# Patient Record
Sex: FEMALE
[De-identification: names, ages, dates, MRNs, and addresses within clinical notes are randomized per-mention and may not be internally consistent; named-entity substitution may affect disease eponyms.]

---

## 2017-07-12 PROBLEM — Z00.00 ENCOUNTER FOR PREVENTIVE HEALTH EXAMINATION: Status: ACTIVE | Noted: 2017-07-12

## 2017-07-20 ENCOUNTER — APPOINTMENT (OUTPATIENT)
Dept: INTERNAL MEDICINE | Facility: CLINIC | Age: 37
End: 2017-07-20

## 2018-08-09 ENCOUNTER — EMERGENCY (EMERGENCY)
Facility: HOSPITAL | Age: 38
LOS: 1 days | Discharge: HOME | End: 2018-08-09
Attending: EMERGENCY MEDICINE | Admitting: EMERGENCY MEDICINE
Payer: COMMERCIAL

## 2018-08-09 VITALS
SYSTOLIC BLOOD PRESSURE: 144 MMHG | DIASTOLIC BLOOD PRESSURE: 90 MMHG | HEART RATE: 66 BPM | TEMPERATURE: 97 F | OXYGEN SATURATION: 98 % | RESPIRATION RATE: 18 BRPM

## 2018-08-09 LAB
ALBUMIN SERPL ELPH-MCNC: 4.3 G/DL — SIGNIFICANT CHANGE UP (ref 3.3–5)
ALP SERPL-CCNC: 68 U/L — SIGNIFICANT CHANGE UP (ref 40–120)
ALT FLD-CCNC: 18 U/L — SIGNIFICANT CHANGE UP (ref 4–33)
APPEARANCE UR: SIGNIFICANT CHANGE UP
AST SERPL-CCNC: 24 U/L — SIGNIFICANT CHANGE UP (ref 4–32)
BACTERIA # UR AUTO: SIGNIFICANT CHANGE UP
BILIRUB SERPL-MCNC: 0.3 MG/DL — SIGNIFICANT CHANGE UP (ref 0.2–1.2)
BILIRUB UR-MCNC: NEGATIVE — SIGNIFICANT CHANGE UP
BLOOD UR QL VISUAL: NEGATIVE — SIGNIFICANT CHANGE UP
BUN SERPL-MCNC: 12 MG/DL — SIGNIFICANT CHANGE UP (ref 7–23)
CALCIUM SERPL-MCNC: 9.5 MG/DL — SIGNIFICANT CHANGE UP (ref 8.4–10.5)
CHLORIDE SERPL-SCNC: 100 MMOL/L — SIGNIFICANT CHANGE UP (ref 98–107)
CO2 SERPL-SCNC: 25 MMOL/L — SIGNIFICANT CHANGE UP (ref 22–31)
COLOR SPEC: YELLOW — SIGNIFICANT CHANGE UP
CREAT SERPL-MCNC: 0.66 MG/DL — SIGNIFICANT CHANGE UP (ref 0.5–1.3)
GLUCOSE SERPL-MCNC: 113 MG/DL — HIGH (ref 70–99)
GLUCOSE UR-MCNC: NEGATIVE — SIGNIFICANT CHANGE UP
HCT VFR BLD CALC: 33 % — LOW (ref 34.5–45)
HGB BLD-MCNC: 10.7 G/DL — LOW (ref 11.5–15.5)
KETONES UR-MCNC: SIGNIFICANT CHANGE UP
LEUKOCYTE ESTERASE UR-ACNC: HIGH
MCHC RBC-ENTMCNC: 26.2 PG — LOW (ref 27–34)
MCHC RBC-ENTMCNC: 32.4 % — SIGNIFICANT CHANGE UP (ref 32–36)
MCV RBC AUTO: 80.7 FL — SIGNIFICANT CHANGE UP (ref 80–100)
MUCOUS THREADS # UR AUTO: SIGNIFICANT CHANGE UP
NITRITE UR-MCNC: NEGATIVE — SIGNIFICANT CHANGE UP
NRBC # FLD: 0 — SIGNIFICANT CHANGE UP
PH UR: 7 — SIGNIFICANT CHANGE UP (ref 4.6–8)
PLATELET # BLD AUTO: 193 K/UL — SIGNIFICANT CHANGE UP (ref 150–400)
PMV BLD: 12.2 FL — SIGNIFICANT CHANGE UP (ref 7–13)
POTASSIUM SERPL-MCNC: 3.7 MMOL/L — SIGNIFICANT CHANGE UP (ref 3.5–5.3)
POTASSIUM SERPL-SCNC: 3.7 MMOL/L — SIGNIFICANT CHANGE UP (ref 3.5–5.3)
PROT SERPL-MCNC: 7.9 G/DL — SIGNIFICANT CHANGE UP (ref 6–8.3)
PROT UR-MCNC: 100 MG/DL — HIGH
RBC # BLD: 4.09 M/UL — SIGNIFICANT CHANGE UP (ref 3.8–5.2)
RBC # FLD: 12.5 % — SIGNIFICANT CHANGE UP (ref 10.3–14.5)
RBC CASTS # UR COMP ASSIST: HIGH (ref 0–?)
SODIUM SERPL-SCNC: 139 MMOL/L — SIGNIFICANT CHANGE UP (ref 135–145)
SP GR SPEC: 1.03 — SIGNIFICANT CHANGE UP (ref 1–1.04)
SQUAMOUS # UR AUTO: SIGNIFICANT CHANGE UP
UROBILINOGEN FLD QL: NORMAL MG/DL — SIGNIFICANT CHANGE UP
WBC # BLD: 7.67 K/UL — SIGNIFICANT CHANGE UP (ref 3.8–10.5)
WBC # FLD AUTO: 7.67 K/UL — SIGNIFICANT CHANGE UP (ref 3.8–10.5)
WBC UR QL: HIGH (ref 0–?)

## 2018-08-09 PROCEDURE — 76705 ECHO EXAM OF ABDOMEN: CPT | Mod: 26

## 2018-08-09 PROCEDURE — 99284 EMERGENCY DEPT VISIT MOD MDM: CPT

## 2018-08-09 RX ORDER — CEPHALEXIN 500 MG
500 CAPSULE ORAL ONCE
Refills: 0 | Status: DISCONTINUED | OUTPATIENT
Start: 2018-08-09 | End: 2018-08-13

## 2018-08-09 RX ORDER — FAMOTIDINE 10 MG/ML
20 INJECTION INTRAVENOUS ONCE
Refills: 0 | Status: COMPLETED | OUTPATIENT
Start: 2018-08-09 | End: 2018-08-09

## 2018-08-09 RX ORDER — CEPHALEXIN 500 MG
1 CAPSULE ORAL
Qty: 10 | Refills: 0
Start: 2018-08-09 | End: 2018-08-13

## 2018-08-09 RX ORDER — FAMOTIDINE 10 MG/ML
1 INJECTION INTRAVENOUS
Qty: 60 | Refills: 0
Start: 2018-08-09 | End: 2018-09-07

## 2018-08-09 RX ORDER — SODIUM CHLORIDE 9 MG/ML
1000 INJECTION INTRAMUSCULAR; INTRAVENOUS; SUBCUTANEOUS ONCE
Refills: 0 | Status: COMPLETED | OUTPATIENT
Start: 2018-08-09 | End: 2018-08-09

## 2018-08-09 RX ADMIN — Medication 30 MILLILITER(S): at 12:55

## 2018-08-09 RX ADMIN — SODIUM CHLORIDE 1000 MILLILITER(S): 9 INJECTION INTRAMUSCULAR; INTRAVENOUS; SUBCUTANEOUS at 12:55

## 2018-08-09 RX ADMIN — FAMOTIDINE 20 MILLIGRAM(S): 10 INJECTION INTRAVENOUS at 12:55

## 2018-08-09 NOTE — ED PROVIDER NOTE - PHYSICAL EXAMINATION
Attending/Calli: Well-appearing, NAD; PERRL/EOMI, non-icterus, supple, no ALEX, no JVD, RRR, CTAB; Abd-soft,+epigastric PT, no rebound, , no HSM; no LE edema, A&Ox3, nonfocal; Skin-warm/dry

## 2018-08-09 NOTE — ED PROVIDER NOTE - PROGRESS NOTE DETAILS
Patient reports feeling much improved. TO have PO challenge. Patient not found in the ED. Patient called overheard. Nursing made aware. Patient now in the ED. Test results and dispo plan reviewed.

## 2018-08-09 NOTE — ED PROVIDER NOTE - OBJECTIVE STATEMENT
Attending/Calli: 38 yo w/ no sig PMHx LMP 8/3/2018 G0 p/w ~ 2 days of nausea and epigastric pain. Denies n/v, change in urinary/bowel habits, no fever/chills, back pain weakness, CP or SOB. Pt reports similar past symptoms when eating heavy and fried foods.

## 2019-10-22 ENCOUNTER — EMERGENCY (EMERGENCY)
Facility: HOSPITAL | Age: 39
LOS: 1 days | Discharge: HOME | End: 2019-10-22
Attending: EMERGENCY MEDICINE | Admitting: EMERGENCY MEDICINE
Payer: COMMERCIAL

## 2019-10-22 ENCOUNTER — OUTPATIENT (OUTPATIENT)
Dept: OUTPATIENT SERVICES | Facility: HOSPITAL | Age: 39
LOS: 1 days | Discharge: STILL ADMITTED OR ADMINISTRATIVE | End: 2019-10-22

## 2019-10-22 VITALS
TEMPERATURE: 98 F | SYSTOLIC BLOOD PRESSURE: 161 MMHG | DIASTOLIC BLOOD PRESSURE: 119 MMHG | RESPIRATION RATE: 17 BRPM | OXYGEN SATURATION: 98 % | HEART RATE: 96 BPM

## 2019-10-22 VITALS
TEMPERATURE: 98 F | DIASTOLIC BLOOD PRESSURE: 105 MMHG | HEART RATE: 100 BPM | SYSTOLIC BLOOD PRESSURE: 152 MMHG | RESPIRATION RATE: 18 BRPM | OXYGEN SATURATION: 98 %

## 2019-10-22 DIAGNOSIS — F41.9 ANXIETY DISORDER, UNSPECIFIED: ICD-10-CM

## 2019-10-22 LAB
ALBUMIN SERPL ELPH-MCNC: 4.4 G/DL — SIGNIFICANT CHANGE UP (ref 3.3–5)
ALP SERPL-CCNC: 74 U/L — SIGNIFICANT CHANGE UP (ref 40–120)
ALT FLD-CCNC: 24 U/L — SIGNIFICANT CHANGE UP (ref 4–33)
ANION GAP SERPL CALC-SCNC: 13 MMO/L — SIGNIFICANT CHANGE UP (ref 7–14)
APAP SERPL-MCNC: < 15 UG/ML — LOW (ref 15–25)
APPEARANCE UR: SIGNIFICANT CHANGE UP
AST SERPL-CCNC: 21 U/L — SIGNIFICANT CHANGE UP (ref 4–32)
BACTERIA # UR AUTO: SIGNIFICANT CHANGE UP
BASOPHILS # BLD AUTO: 0.07 K/UL — SIGNIFICANT CHANGE UP (ref 0–0.2)
BASOPHILS NFR BLD AUTO: 0.9 % — SIGNIFICANT CHANGE UP (ref 0–2)
BILIRUB SERPL-MCNC: 0.3 MG/DL — SIGNIFICANT CHANGE UP (ref 0.2–1.2)
BILIRUB UR-MCNC: NEGATIVE — SIGNIFICANT CHANGE UP
BLOOD UR QL VISUAL: NEGATIVE — SIGNIFICANT CHANGE UP
BUN SERPL-MCNC: 7 MG/DL — SIGNIFICANT CHANGE UP (ref 7–23)
CALCIUM SERPL-MCNC: 9.6 MG/DL — SIGNIFICANT CHANGE UP (ref 8.4–10.5)
CHLORIDE SERPL-SCNC: 105 MMOL/L — SIGNIFICANT CHANGE UP (ref 98–107)
CO2 SERPL-SCNC: 22 MMOL/L — SIGNIFICANT CHANGE UP (ref 22–31)
COLOR SPEC: SIGNIFICANT CHANGE UP
CREAT SERPL-MCNC: 0.57 MG/DL — SIGNIFICANT CHANGE UP (ref 0.5–1.3)
EOSINOPHIL # BLD AUTO: 0.12 K/UL — SIGNIFICANT CHANGE UP (ref 0–0.5)
EOSINOPHIL NFR BLD AUTO: 1.5 % — SIGNIFICANT CHANGE UP (ref 0–6)
ETHANOL BLD-MCNC: < 10 MG/DL — SIGNIFICANT CHANGE UP
GLUCOSE SERPL-MCNC: 115 MG/DL — HIGH (ref 70–99)
GLUCOSE UR-MCNC: NEGATIVE — SIGNIFICANT CHANGE UP
HCT VFR BLD CALC: 43.1 % — SIGNIFICANT CHANGE UP (ref 34.5–45)
HGB BLD-MCNC: 13.7 G/DL — SIGNIFICANT CHANGE UP (ref 11.5–15.5)
HYALINE CASTS # UR AUTO: SIGNIFICANT CHANGE UP
IMM GRANULOCYTES NFR BLD AUTO: 0.4 % — SIGNIFICANT CHANGE UP (ref 0–1.5)
KETONES UR-MCNC: NEGATIVE — SIGNIFICANT CHANGE UP
LEUKOCYTE ESTERASE UR-ACNC: SIGNIFICANT CHANGE UP
LYMPHOCYTES # BLD AUTO: 1.49 K/UL — SIGNIFICANT CHANGE UP (ref 1–3.3)
LYMPHOCYTES # BLD AUTO: 19.2 % — SIGNIFICANT CHANGE UP (ref 13–44)
MCHC RBC-ENTMCNC: 26.6 PG — LOW (ref 27–34)
MCHC RBC-ENTMCNC: 31.8 % — LOW (ref 32–36)
MCV RBC AUTO: 83.5 FL — SIGNIFICANT CHANGE UP (ref 80–100)
MONOCYTES # BLD AUTO: 0.61 K/UL — SIGNIFICANT CHANGE UP (ref 0–0.9)
MONOCYTES NFR BLD AUTO: 7.8 % — SIGNIFICANT CHANGE UP (ref 2–14)
NEUTROPHILS # BLD AUTO: 5.46 K/UL — SIGNIFICANT CHANGE UP (ref 1.8–7.4)
NEUTROPHILS NFR BLD AUTO: 70.2 % — SIGNIFICANT CHANGE UP (ref 43–77)
NITRITE UR-MCNC: POSITIVE — HIGH
NRBC # FLD: 0 K/UL — SIGNIFICANT CHANGE UP (ref 0–0)
PH UR: 6.5 — SIGNIFICANT CHANGE UP (ref 5–8)
PLATELET # BLD AUTO: 189 K/UL — SIGNIFICANT CHANGE UP (ref 150–400)
PMV BLD: 12.4 FL — SIGNIFICANT CHANGE UP (ref 7–13)
POTASSIUM SERPL-MCNC: 3.8 MMOL/L — SIGNIFICANT CHANGE UP (ref 3.5–5.3)
POTASSIUM SERPL-SCNC: 3.8 MMOL/L — SIGNIFICANT CHANGE UP (ref 3.5–5.3)
PROT SERPL-MCNC: 7.7 G/DL — SIGNIFICANT CHANGE UP (ref 6–8.3)
PROT UR-MCNC: NEGATIVE — SIGNIFICANT CHANGE UP
RBC # BLD: 5.16 M/UL — SIGNIFICANT CHANGE UP (ref 3.8–5.2)
RBC # FLD: 12.6 % — SIGNIFICANT CHANGE UP (ref 10.3–14.5)
RBC CASTS # UR COMP ASSIST: SIGNIFICANT CHANGE UP (ref 0–?)
SALICYLATES SERPL-MCNC: < 5 MG/DL — LOW (ref 15–30)
SODIUM SERPL-SCNC: 140 MMOL/L — SIGNIFICANT CHANGE UP (ref 135–145)
SP GR SPEC: 1.01 — SIGNIFICANT CHANGE UP (ref 1–1.04)
SQUAMOUS # UR AUTO: SIGNIFICANT CHANGE UP
TSH SERPL-MCNC: 1.39 UIU/ML — SIGNIFICANT CHANGE UP (ref 0.27–4.2)
UROBILINOGEN FLD QL: NORMAL — SIGNIFICANT CHANGE UP
WBC # BLD: 7.78 K/UL — SIGNIFICANT CHANGE UP (ref 3.8–10.5)
WBC # FLD AUTO: 7.78 K/UL — SIGNIFICANT CHANGE UP (ref 3.8–10.5)
WBC UR QL: HIGH (ref 0–?)

## 2019-10-22 PROCEDURE — 90792 PSYCH DIAG EVAL W/MED SRVCS: CPT

## 2019-10-22 PROCEDURE — 99283 EMERGENCY DEPT VISIT LOW MDM: CPT

## 2019-10-22 RX ADMIN — Medication 0.5 MILLIGRAM(S): at 19:39

## 2019-10-22 NOTE — ED ADULT NURSE NOTE - ED STAT RN HANDOFF DETAILS
Pt discharged by provider with instructions.  Pt verbalizes understanding.  Pt denies SI/HI.  Pt denies visual or auditory hallucinations or other complaints.  Pt leaving ED with Spouse/

## 2019-10-22 NOTE — ED ADULT NURSE NOTE - OBJECTIVE STATEMENT
Pt. A&Ox3, c/o anxiety and paranoia x few months. Admits to SI without a plan. Pt. not on any medications for anxiety. Denies HI or hallucinations. Denies cp, sob or dizziness. MD at bedside for evaluation. Labs sent as ordered. To be evaluated in  by psychiatrist. Ativan PO given as ordered. VS done as charted, breathing well on RA. BP noted to be elevated- MD aware-pt. asymptomatic and no treatment at this time. Will continue to monitor.

## 2019-10-22 NOTE — ED BEHAVIORAL HEALTH ASSESSMENT NOTE - SUICIDE PROTECTIVE FACTORS
Responsibility to family and others/Identifies reasons for living/Has future plans/Supportive social network of family or friends/Fear of death or the actual act of killing self/Cultural, spiritual and/or moral attitudes against suicide/Engaged in work or school/Positive therapeutic relationships/Episcopal beliefs

## 2019-10-22 NOTE — ED PROVIDER NOTE - OBJECTIVE STATEMENT
39 y/o female no PMH presents to ER for anxiety and elevated BP. Pt. actively anxious in room,  aiding in story - states that in 2003 received some form of workplace/needlestick injury and devloped phobia/panic attack about uday diseases (did not contract disease) - episode resolved and has been fine since.  states in July while visiting [facility] patient saw on news about aids/needlesticks and had another panic attack - since then has been experiencing more frequent episodes of panic attacks/stress even if a pen brushes her finger or takes an object from another person worrying she is uday a disease - to the point where she is now unable to function at home. Pt. endorses that this worry/panic is driving her to thoughts of self harm (no specific plan) -  took patient to [facility] crisis center today and she was found to have elevated BP and sent to ER for further evaluation. Pt. currently admits to nonspecific thoughts of self harm and feels very anxious/panicked. Pt. denies chest pain shortness of breath weakness dizziness. Denies AH/VH/HI.

## 2019-10-22 NOTE — ED BEHAVIORAL HEALTH ASSESSMENT NOTE - SUMMARY
38/F with remote hx of anxiety disorder treated with unrecalled medication in [place] (2003), no prior psych hosps, no hx of SA/SIB and no substance abuse hx.  Pt has no hx of post partum psychosis/depression.  Today, presented to the ED accompanied by  for evaluation of her "worsening anxiety".  Initially, walked into the [facility] clinic but was referred to [facility] as intake could not be done due to Pt having elevated BP.  Since her ED admission, the Pt has been calm and cooperative.  There has been no agitation/aggressive behavior.  No verbalization of passive/active SI/HI.   No signs/symptoms of acute samantha or florid psychosis.  No AH/VH.  She has not tested limits and was able to maintain appropriate boundaries. Pt was easily redirected.  Overall, no management issues.  Pt has has hx of anxiety since 2003.  In the interim, has been functioning well.  Anxiety symptoms recurred 3 months prior to this ED visit.  Presenting symptoms may likely point out to a specific phobia.  unable to rule out OCD. Though Pt is depressed, she does not meet MDD criteria.  Pt is not manic or psychotic.  Collateral information obtained from the  who reported that Pt has been doing very well. He does not object to discharging Pt and ensures us re: limitation of access to pills, sharp objects, things that potentially can cause harm to herself and to family/others, etc. Pt and  are ok towards pursuing psychiatric OP follow up for the purpose of therapy and psych medication.  From the psychiatric standpoint, no indication for in-Pt psych admission.   Recommendations:  1. Hydroxyzine 10mg q6Hrs PRN for anxiety attacks.    2. discussed pursuing psychotherapy.   3. Emergency protocol reviewed.  Pt and ex-wife were both adviced to call 911 or come to the nearest ED should symptoms worsen; have increasing bouts of agitation/aggressive behavior; having SI/HI.

## 2019-10-22 NOTE — ED BEHAVIORAL HEALTH ASSESSMENT NOTE - OTHER PAST PSYCHIATRIC HISTORY (INCLUDE DETAILS REGARDING ONSET, COURSE OF ILLNESS, INPATIENT/OUTPATIENT TREATMENT)
remote hx of anxiety in 2003 after her work related injury - claimed she saw a psychiatrist in [place] and was prescribed unrecalled medications taken for 15 days only - with good response towards control of her anxiety  denied hx of psych hosps  no hx of suicidal attempts

## 2019-10-22 NOTE — ED ADULT NURSE REASSESSMENT NOTE - NS ED NURSE REASSESS COMMENT FT1
Pt received in [facility].  Pt is AOX3, endorses feeling anxious, and "never feel like that before".  Pt is calm at this time with tearful eyes.

## 2019-10-22 NOTE — ED PROVIDER NOTE - NSFOLLOWUPINSTRUCTIONS_ED_ALL_ED_FT
Call your primary care doctor today or tomorrow to schedule follow up appointment for within the next 3-5 days.  Continue taking your medications as prescribed.  Return to this Emergency Department if you experience worsening condition or for any other emergency

## 2019-10-22 NOTE — ED ADULT TRIAGE NOTE - CHIEF COMPLAINT QUOTE
pt c/o increased anxiety and panic. went to [facility] for psychiatric appt, buy sent pt to the ED for Med eval 2/2 HTN. pt with . denies si/hi/ah/vh.

## 2019-10-22 NOTE — ED BEHAVIORAL HEALTH ASSESSMENT NOTE - SAFETY PLAN ADDT'L DETAILS
Safety plan discussed with.../Education provided regarding environmental safety / lethal means restriction/Provision of National Suicide Prevention Lifeline 6-747-609-KUEH (8679)

## 2019-10-22 NOTE — ED BEHAVIORAL HEALTH ASSESSMENT NOTE - VIOLENCE PROTECTIVE FACTORS:
Residential stability/Relationship stability/Employment stability/Sobriety/Engagement in treatment/Affective Stability/Insight into violence risk and need for management/treatment

## 2019-10-22 NOTE — ED BEHAVIORAL HEALTH ASSESSMENT NOTE - HPI (INCLUDE ILLNESS QUALITY, SEVERITY, DURATION, TIMING, CONTEXT, MODIFYING FACTORS, ASSOCIATED SIGNS AND SYMPTOMS)
Pt is a 38 yr old [facility] female, , domiciled and employed.  Remote hx of anxiety disorder treated with unrecalled medication in [place] (2003), no prior psych hosps, no hx of SA/SIB and no substance abuse hx.  Pt has no hx of post partum psychosis/depression.  Today, presented to the ED accompanied by  for evaluation of her "worsening anxiety".  Initially, walked into the [facility]PD clinic but was referred to [facility] as intake could not be done due to Pt having elevated BP.      Pt is seen bedside.  appeared teary eyed and anxious.  Claimed that for the past 3 months after her return from a vacation in [place], began to experience recurrence of panic attacks.  Described the panic attack as having "overall sense of nervousness/jitteriness" and sense of impending doom.  Points to precipitating event as seeing local news on TV regarding individuals intentionally stabbing a girl with a needle to inflict HIV/AIDS.  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  Claimed that she accidentally hit a bed railing and injuring her left forearm.  She became increasingly anxiety/fearful that she may have contracted HIV as the motel that she was working was "dirty".  Pt reported that she resorted to consulting a psychiatrist back then and was prescribed medication with good effect.  Pt reported afterwhich,  was doing well until 3 months ago.  Since last week, been preoccupied with thoughts that she needs to wash, clean whatever objects she comes into contact with for fear of uday HIV.  Has become distraught by the thought of contraction that she claims feeling depressed and worries constantly.  however, denied experiencing any signs/symptoms suggestive of MDD; denies harboring any passive/active SI/HI.  Denied symptoms of samantha.  denied experiencing any perceptual disturbances nor paranoid delusions.  Has no PTSD symptoms.  Reports still able to go to work, take care of the children; no compromise in her ADLs    Collateral information obtained from the : who corroborated accounts provided by the Pt pertaining to the symptoms of anxiety and fear of uday HIV.  Otherwise, he denies Pt has any other specific anxiety disorder symptoms like MAURILIO/Social anxiety disorder/agoraphobia.  Overall, reported that Pt has been doing well since the last episode of panic attacks in 2003.  Since coming back from their [place] vacation in July, he reported that Pt has become intermittently fearful of uday HIV.  Denied symptoms of samantha or psychosis.  He did not raise any safety concerns.  Pt has not verbalized any passive/active SI/HI.  He does not feel that she needs in-Pt admission.  He is ok with her pursuing OP treatment and is supportive

## 2019-10-22 NOTE — ED BEHAVIORAL HEALTH ASSESSMENT NOTE - DESCRIPTION
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 [place] [facility] [place].  Loves listening to music, reading books, finished College Since her  ED arrival, the Pt has been calm and cooperative.  There has been no agitation/aggressive behavior.  No verbalization of passive/active SI/HI.   No signs/symptoms of samantha or psychosis.  No AH/VH.  She has not tested limits.. Has maintained appropriate boundaries. Pt has been easily redirected.  Overall, no management issues.     Vital Signs Last 24 Hrs  T(C): 36.4 (22 Oct 2019 20:12), Max: 36.8 (22 Oct 2019 17:25)  T(F): 97.6 (22 Oct 2019 20:12), Max: 98.2 (22 Oct 2019 17:25)  HR: 100 (22 Oct 2019 20:12) (92 - 100)  BP: 152/105 (22 Oct 2019 20:12) (152/105 - 161/119)  BP(mean): --  RR: 18 (22 Oct 2019 20:12) (17 - 18)  SpO2: 98% (22 Oct 2019 20:12) (98% - 98%)    Pertinent labs noted: UTI

## 2019-10-22 NOTE — ED BEHAVIORAL HEALTH ASSESSMENT NOTE - RISK ASSESSMENT
Low Acute Suicide Risk RISK:  Modifiable risk factors: anxiety  Unmodifiable risk factors: prior hx of anxiety disorder  Protective factors: Pt is working and spouse reported that Pt is doing well in the community, has no history of past psych admission, no history of past SA, .living with family, domiciled, has children, future-oriented, endorses responsibility to family as protective, access to lethal means (like pills, knives) consciously restricted by spouse, denies (and no objective evidence of) suicidality    Given above, the Pt Pt is at low risk of self-harm.  There is no identifiable acute increase in risk that would be mitigated by an involuntary psychiatric admission. Pt can be discharged back to the community

## 2019-10-22 NOTE — ED PROVIDER NOTE - CLINICAL SUMMARY MEDICAL DECISION MAKING FREE TEXT BOX
37 y/o female c/o anxiety/panic attacks w/ thoughts of self harm and elevated BP  -elevated blood pressure most likely secondary to anxiety/stress  -will need psychiatric evaluation due to anxiety/self harm thoughts  -psych labs, ativan, no acute BP intervention at this time as patient is medically asymptomatic  -transfer to  for psych eval

## 2019-10-22 NOTE — ED PROVIDER NOTE - PATIENT PORTAL LINK FT
You can access the FollowMyHealth Patient Portal offered by [facility] by registering at the following website: [email or web address]. By joining [facility]’s FollowMyHealth portal, you will also be able to view your health information using other applications (apps) compatible with our system.

## 2019-10-22 NOTE — ED BEHAVIORAL HEALTH ASSESSMENT NOTE - MEDICATIONS (PRESCRIPTIONS, DIRECTIONS)
Hydroxyzine 10mg q6hrs PRN; treatment of UTI c/o ED; pt was encouraged to follow up with her PCP re: management of her HTN

## 2019-10-22 NOTE — ED ADULT NURSE REASSESSMENT NOTE - NS ED NURSE REASSESS COMMENT FT1
Rpt given to ELLIE Willis. Pt. walked over to low acuity side with PES. Pt. understanding and agreeable.

## 2019-10-22 NOTE — ED ADULT NURSE NOTE - CHIEF COMPLAINT QUOTE
pt c/o increased anxiety and panic. went to [facility] for psychiatric appt, buy sent pt to the ED for Med eval 2/2 HTN. pt with . denies si/hi/ah/vh.

## 2019-10-23 RX ORDER — HYDROXYZINE HCL 10 MG
1 TABLET ORAL
Qty: 20 | Refills: 0
Start: 2019-10-23 | End: 2019-10-27

## 2019-10-23 NOTE — ED BEHAVIORAL HEALTH NOTE - BEHAVIORAL HEALTH NOTE
Follow up:  Worker called for  follow up for patient at [facility] [place]. Worker faxed referral to [phone]. Worker called to confirm that fax was received and spoke to Mary  Follow up:  Worker called for  follow up for patient at [facility] [place]. Worker faxed referral to [phone]. Worker called to confirm that fax was received and spoke to Rafal. Worker received a call back message on sw phone from [facility] intake counselor who states she will reach out to patient to conduct a screening via phone.

## 2019-10-30 DIAGNOSIS — F42.9 OBSESSIVE-COMPULSIVE DISORDER, UNSPECIFIED: ICD-10-CM

## 2019-11-04 ENCOUNTER — INPATIENT (INPATIENT)
Facility: HOSPITAL | Age: 39
LOS: 8 days | Discharge: HOME | End: 2019-11-13
Attending: PSYCHIATRY & NEUROLOGY | Admitting: PSYCHIATRY & NEUROLOGY
Payer: COMMERCIAL

## 2019-11-04 VITALS
TEMPERATURE: 98 F | RESPIRATION RATE: 18 BRPM | HEART RATE: 99 BPM | SYSTOLIC BLOOD PRESSURE: 169 MMHG | OXYGEN SATURATION: 100 % | DIASTOLIC BLOOD PRESSURE: 106 MMHG

## 2019-11-04 DIAGNOSIS — F42.9 OBSESSIVE-COMPULSIVE DISORDER, UNSPECIFIED: ICD-10-CM

## 2019-11-04 LAB
ALBUMIN SERPL ELPH-MCNC: 4 G/DL — SIGNIFICANT CHANGE UP (ref 3.3–5)
ALP SERPL-CCNC: 71 U/L — SIGNIFICANT CHANGE UP (ref 40–120)
ALT FLD-CCNC: 20 U/L — SIGNIFICANT CHANGE UP (ref 4–33)
ANION GAP SERPL CALC-SCNC: 13 MMO/L — SIGNIFICANT CHANGE UP (ref 7–14)
APAP SERPL-MCNC: < 15 UG/ML — LOW (ref 15–25)
AST SERPL-CCNC: 18 U/L — SIGNIFICANT CHANGE UP (ref 4–32)
BASOPHILS # BLD AUTO: 0.06 K/UL — SIGNIFICANT CHANGE UP (ref 0–0.2)
BASOPHILS NFR BLD AUTO: 0.7 % — SIGNIFICANT CHANGE UP (ref 0–2)
BILIRUB SERPL-MCNC: 0.2 MG/DL — SIGNIFICANT CHANGE UP (ref 0.2–1.2)
BUN SERPL-MCNC: 7 MG/DL — SIGNIFICANT CHANGE UP (ref 7–23)
CALCIUM SERPL-MCNC: 9.5 MG/DL — SIGNIFICANT CHANGE UP (ref 8.4–10.5)
CHLORIDE SERPL-SCNC: 106 MMOL/L — SIGNIFICANT CHANGE UP (ref 98–107)
CO2 SERPL-SCNC: 19 MMOL/L — LOW (ref 22–31)
CREAT SERPL-MCNC: 0.48 MG/DL — LOW (ref 0.5–1.3)
EOSINOPHIL # BLD AUTO: 0.15 K/UL — SIGNIFICANT CHANGE UP (ref 0–0.5)
EOSINOPHIL NFR BLD AUTO: 1.7 % — SIGNIFICANT CHANGE UP (ref 0–6)
ETHANOL BLD-MCNC: < 10 MG/DL — SIGNIFICANT CHANGE UP
GLUCOSE SERPL-MCNC: 127 MG/DL — HIGH (ref 70–99)
HCG SERPL-ACNC: < 5 MIU/ML — SIGNIFICANT CHANGE UP
HCT VFR BLD CALC: 39.8 % — SIGNIFICANT CHANGE UP (ref 34.5–45)
HGB BLD-MCNC: 13.4 G/DL — SIGNIFICANT CHANGE UP (ref 11.5–15.5)
IMM GRANULOCYTES NFR BLD AUTO: 0.3 % — SIGNIFICANT CHANGE UP (ref 0–1.5)
LYMPHOCYTES # BLD AUTO: 1.83 K/UL — SIGNIFICANT CHANGE UP (ref 1–3.3)
LYMPHOCYTES # BLD AUTO: 20.8 % — SIGNIFICANT CHANGE UP (ref 13–44)
MCHC RBC-ENTMCNC: 27.4 PG — SIGNIFICANT CHANGE UP (ref 27–34)
MCHC RBC-ENTMCNC: 33.7 % — SIGNIFICANT CHANGE UP (ref 32–36)
MCV RBC AUTO: 81.4 FL — SIGNIFICANT CHANGE UP (ref 80–100)
MONOCYTES # BLD AUTO: 0.77 K/UL — SIGNIFICANT CHANGE UP (ref 0–0.9)
MONOCYTES NFR BLD AUTO: 8.8 % — SIGNIFICANT CHANGE UP (ref 2–14)
NEUTROPHILS # BLD AUTO: 5.94 K/UL — SIGNIFICANT CHANGE UP (ref 1.8–7.4)
NEUTROPHILS NFR BLD AUTO: 67.7 % — SIGNIFICANT CHANGE UP (ref 43–77)
NRBC # FLD: 0 K/UL — SIGNIFICANT CHANGE UP (ref 0–0)
PLATELET # BLD AUTO: 196 K/UL — SIGNIFICANT CHANGE UP (ref 150–400)
PMV BLD: 11.8 FL — SIGNIFICANT CHANGE UP (ref 7–13)
POTASSIUM SERPL-MCNC: 3.5 MMOL/L — SIGNIFICANT CHANGE UP (ref 3.5–5.3)
POTASSIUM SERPL-SCNC: 3.5 MMOL/L — SIGNIFICANT CHANGE UP (ref 3.5–5.3)
PROT SERPL-MCNC: 7.6 G/DL — SIGNIFICANT CHANGE UP (ref 6–8.3)
RBC # BLD: 4.89 M/UL — SIGNIFICANT CHANGE UP (ref 3.8–5.2)
RBC # FLD: 12.4 % — SIGNIFICANT CHANGE UP (ref 10.3–14.5)
SALICYLATES SERPL-MCNC: < 5 MG/DL — LOW (ref 15–30)
SODIUM SERPL-SCNC: 138 MMOL/L — SIGNIFICANT CHANGE UP (ref 135–145)
TSH SERPL-MCNC: 0.51 UIU/ML — SIGNIFICANT CHANGE UP (ref 0.27–4.2)
WBC # BLD: 8.78 K/UL — SIGNIFICANT CHANGE UP (ref 3.8–10.5)
WBC # FLD AUTO: 8.78 K/UL — SIGNIFICANT CHANGE UP (ref 3.8–10.5)

## 2019-11-04 PROCEDURE — 99285 EMERGENCY DEPT VISIT HI MDM: CPT

## 2019-11-04 NOTE — ED ADULT NURSE NOTE - CHIEF COMPLAINT QUOTE
Pt arrives w/  c/o anxiety, panic attacks, OCD behaviors, pervasive thoughts regarding getting sick/infections, agitation in the home and outbursts.  Per  has been pulling her hair, banging against walls, denies physical violence against him or other people.  Pt endorses feeling increased stress/anxiety but not SI/HI, AVH.  Compliant w/ rx'd Prozac.  Denies prior psych admissions. No medical complaints.  Per Faviola NP to be seen in  next.

## 2019-11-04 NOTE — ED BEHAVIORAL HEALTH ASSESSMENT NOTE - SUICIDE PROTECTIVE FACTORS
Responsibility to family and others/Identifies reasons for living/Has future plans/Supportive social network of family or friends/Fear of death or the actual act of killing self/Cultural, spiritual and/or moral attitudes against suicide/Engaged in work or school/Positive therapeutic relationships/Jehovah's witness beliefs

## 2019-11-04 NOTE — ED ADULT NURSE NOTE - OBJECTIVE STATEMENT
alert no acute distress c/o feeling very anxious  is afraid of a scratch on her left to  also very afraid of uday HIV   states she doesn't know what happened to her mind

## 2019-11-04 NOTE — ED BEHAVIORAL HEALTH ASSESSMENT NOTE - HPI (INCLUDE ILLNESS QUALITY, SEVERITY, DURATION, TIMING, CONTEXT, MODIFYING FACTORS, ASSOCIATED SIGNS AND SYMPTOMS)
The Pt is a 39 yr old [place] female, , domiciled with spouse and 2 children; She is employed.  The Pt has hx of OCD, was last seen at the [facility] ED as well as Crisis Center in 10/22/2019 for an evaluation regarding her anxiety.  Pt was prescribed Prozac 10mg daily at the Crisis Center whereas at the [facility] ED, she was prescribed with Atarax 10mg q6Hrs PRN.  Pt has prior hx of anxiety disorder which she claimed was treated with an unrecalled psych med in 2003.  She has no past psych admissions and no hx of SA/self injurious behavior.  Today, BIB  due to worsening anxiety with preoccupation that she will get sick, has been increasingly getting agitated at home, pulling her hair, and banging against the walls.      Pt is seen bedside.  Appeared jittery, hair unkempt, disheveled.  Reports that previously, had been feeling anxious but now, is feeling better.  She reports of the same problem that she had when writer saw her x 2 weeks ago.  She points to her left middle toe and repeatedly asks writer if writer can see an open wound.  She reports feeling anxious and "stressed out" with the possibility of "uday disease especially HIV entering her skin and wounds".  She denied feeling depressed.. only "stressed out".  She is minimizing symptoms and writer pointed this out to her.  Pt claims that currently after talking to writer, she feels better.. She says there is no need for her to stay in the hospital.  She denied harboring any passive/active SI/HI.  She denied having AH/VH.  Reported that she is compliant with her medications.  denied abusing any drugs/alcohol    Collateral from her : Reported that since after the 10/22/2019 ED encounter, they went to [facility] and was prescribed with Prozac.  They were adviced to follow up this month.   claimed Pt has been compliant with medications.  However, it seems that the symptoms have gotten worse.   described that Pt has been constantly worrying about "uady infection, disease entering her skin or wound.  Last month, Pt told  that she feared uday HIV  from the work place as somebody coughed.   reported Pt became increasingly worried that she will be infected.  Since then, has been repeatedly asking the  to examine her for "abrasions, any open wounds".   attempted to pacify her fears claiming that he pointed out to her that he has not seen open wounds or scratches.  Despite his reassurance, the pt kept on insisting that she has scratches over both her forearms and feet.  Pt's  reported that lately, the pt has been increasingly getting more agitated; preoccupied about uday disease and infection.   described the agitation as Pt yelling, screaming, crying/wailing.. the pt has resorted to pulling her hair as well as banging herself against the walls.  Due to her behavior,  reported that their children have cried and fear their mother's behavior.   reported that Pt has been verbalizing passive SI.   reported that Pt told him that she felt that she will die and he will have to take care of their children.  He denied that Pt has active SI/HI.  As a consequence of Pt's worsening anxiety and aggressive behavior, she has not been able to work, has limited capacity to take care of their children and has limited capacity to fend for self.  He is advocating that she be hospitalized out of concern for her safety/well-being and their family.

## 2019-11-04 NOTE — ED BEHAVIORAL HEALTH ASSESSMENT NOTE - SUMMARY
39/F with hx of OCD, prior hx of anxiety disorder, no past psych admissions and no hx of SA/self injurious behavior.  Today, BIB  due to worsening anxiety with preoccupation that she will get sick, has been increasingly getting agitated at home, pulling her hair, and banging against the walls.  At this time, due to worsening anxiety symptoms, the Pt's overall functionality has been significantly impaired.  She is exhibiting affective dysregulation, is delusional, has poor insight, limited judgement.  The Pt has been unable to fend for self and others.  She has passive SI.  As such, cannot be safely discharged back to the community.  Pt will need in-Pt psych admission for stabilization.     Recommendations:  1. continue with Prozac 10mg daily. will need titration for better control of symptoms.   2. consider adding an AAP as adjunct for Pt's current presentation  3. PRNs: ativan 1mg PO/IM q6hrs PRN for agitation/anxiety/SEVERE agitation  4. will admit on 9.39 status.   - called [facility] and [facility] - was informed that there are no beds available.  Hence, pt will be boarding 39/F with hx of OCD, prior hx of anxiety disorder, no past psych admissions and no hx of SA/self injurious behavior.  Today, BIB  due to worsening anxiety with preoccupation that she will get sick, has been increasingly getting agitated at home, pulling her hair, and banging against the walls.  At this time, due to worsening anxiety symptoms, the Pt's overall functionality has been significantly impaired.  She is exhibiting affective dysregulation, is delusional, has poor insight, limited judgement.  The Pt has been unable to fend for self and others.  She has passive SI.  As such, cannot be safely discharged back to the community.  Pt will need in-Pt psych admission for stabilization.     Recommendations:  1. Increase Prozac to 20 mg po daily.  2. consider adding an AAP as adjunct for Pt's current presentation  3. PRNs: ativan 1mg PO/IM q6hrs PRN for agitation/anxiety/SEVERE agitation  4. will admit on 9.39 status.

## 2019-11-04 NOTE — ED BEHAVIORAL HEALTH ASSESSMENT NOTE - OTHER
ongoing worsening anxiety symptoms, paranoia accented CVDOUGIE, I STOP Reference # [id number] - NO CONTROLLED SUBSTANCES PRESCRIBED disheveled superficially cooperative, minimizing by Hx: impaired

## 2019-11-04 NOTE — ED BEHAVIORAL HEALTH ASSESSMENT NOTE - DESCRIPTION
Since her  ED arrival, the Pt has been superficially cooperative, minimizing symptoms.  She has been preoccupied with discharge.  She says that she is "feeling ok now" and is requesting to be discharged.  There has been no agitation/aggressive behavior.  No verbalization of passive/active SI/HI.       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 relocated to [facility] in 2006.  Loves listening to music, reading books, finished College

## 2019-11-04 NOTE — ED BEHAVIORAL HEALTH ASSESSMENT NOTE - RISK ASSESSMENT
RISK:  Modifiable risk factors: worsening symptoms of OCD, anxiety, delusion  Unmodifiable risk factors: hx of OCD, prior hx of anxiety disorder, prior ED visit for worsening anxiety  Protective factors: no prior psych hosp, no hx of SA, no hx of substance abuse, compliant with meds, domiciled with family, feels a sense of responsibility towards her family particularly her children, access to lethal means (like pills, knives) consciously restricted by spouse, denies (and has no objective evidence of) suicidality    Given above, the Pt Pt is at moderate risk of self-harm. Collateral information obtained from the spouse who verbalized safety concerns for this Pt.  Pt has been having passive SI.  Is unable to fully function due to worsening symptoms.  Will need in-Pt psych admission for stabilization and safety Moderate Acute Suicide Risk

## 2019-11-04 NOTE — ED ADULT TRIAGE NOTE - CHIEF COMPLAINT QUOTE
Pt arrives w/  c/o anxiety, panic attacks, OCD behaviors, pervasive thoughts regarding getting sick/infections.  Per  has been pulling her hair, banging against walls.  Pt endorses feeling increased stress/anxiety but not SI/HI, AVH.  Complaint w/ rx'd Prozac.  Denies prior psych admissions. No medical complaints. Pt arrives w/  c/o anxiety, panic attacks, OCD behaviors, pervasive thoughts regarding getting sick/infections, agitation in the home and outbursts.  Per  has been pulling her hair, banging against walls, denies physical violence against him or other people.  Pt endorses feeling increased stress/anxiety but not SI/HI, AVH.  Compliant w/ rx'd Prozac.  Denies prior psych admissions. No medical complaints.  Per Faviola NP to be seen in  next.

## 2019-11-05 DIAGNOSIS — F42.9 OBSESSIVE-COMPULSIVE DISORDER, UNSPECIFIED: ICD-10-CM

## 2019-11-05 LAB
AMPHET UR-MCNC: NEGATIVE — SIGNIFICANT CHANGE UP
APPEARANCE UR: SIGNIFICANT CHANGE UP
BACTERIA # UR AUTO: SIGNIFICANT CHANGE UP
BARBITURATES UR SCN-MCNC: NEGATIVE — SIGNIFICANT CHANGE UP
BENZODIAZ UR-MCNC: NEGATIVE — SIGNIFICANT CHANGE UP
BILIRUB UR-MCNC: NEGATIVE — SIGNIFICANT CHANGE UP
BLOOD UR QL VISUAL: NEGATIVE — SIGNIFICANT CHANGE UP
CANNABINOIDS UR-MCNC: NEGATIVE — SIGNIFICANT CHANGE UP
COCAINE METAB.OTHER UR-MCNC: NEGATIVE — SIGNIFICANT CHANGE UP
COLOR SPEC: SIGNIFICANT CHANGE UP
GLUCOSE UR-MCNC: NEGATIVE — SIGNIFICANT CHANGE UP
KETONES UR-MCNC: NEGATIVE — SIGNIFICANT CHANGE UP
LEUKOCYTE ESTERASE UR-ACNC: HIGH
METHADONE UR-MCNC: NEGATIVE — SIGNIFICANT CHANGE UP
NITRITE UR-MCNC: POSITIVE — SIGNIFICANT CHANGE UP
OPIATES UR-MCNC: NEGATIVE — SIGNIFICANT CHANGE UP
OXYCODONE UR-MCNC: NEGATIVE — SIGNIFICANT CHANGE UP
PCP UR-MCNC: NEGATIVE — SIGNIFICANT CHANGE UP
PH UR: 6 — SIGNIFICANT CHANGE UP (ref 5–8)
PROT UR-MCNC: NEGATIVE — SIGNIFICANT CHANGE UP
RBC CASTS # UR COMP ASSIST: SIGNIFICANT CHANGE UP (ref 0–?)
SP GR SPEC: 1.01 — SIGNIFICANT CHANGE UP (ref 1–1.04)
SQUAMOUS # UR AUTO: SIGNIFICANT CHANGE UP
UROBILINOGEN FLD QL: NORMAL — SIGNIFICANT CHANGE UP
WBC UR QL: SIGNIFICANT CHANGE UP (ref 0–?)

## 2019-11-05 RX ORDER — FLUOXETINE HCL 10 MG
20 CAPSULE ORAL DAILY
Refills: 0 | Status: DISCONTINUED | OUTPATIENT
Start: 2019-11-05 | End: 2019-11-11

## 2019-11-05 RX ORDER — FLUOXETINE HCL 10 MG
10 CAPSULE ORAL DAILY
Refills: 0 | Status: DISCONTINUED | OUTPATIENT
Start: 2019-11-05 | End: 2019-11-05

## 2019-11-05 RX ADMIN — Medication 20 MILLIGRAM(S): at 11:23

## 2019-11-05 RX ADMIN — Medication 1 MILLIGRAM(S): at 21:11

## 2019-11-05 NOTE — ED PROVIDER NOTE - OBJECTIVE STATEMENT
This is a 39 yr old F, pmh OCD, anxiety and panic attack, with increased anxiety. Bib , acting out behaviour, pulling her won hair, banging her head and aggressive towards herself. Pt obsessed and preoccupied of the thought she will get an infection. Anxious, cooperative, no physical distress. Denies SI/HI/AH/VH. Denies falling, punching or kicking any objects. Denies pain, SOB, fever, chills, chest and abdominal discomfort. Denies recent use of alcohol or illicit drug. No evidence of physical injuries. This is a 39 yr old F, pmh OCD, anxiety and panic attack, with increased anxiety. Bib , acting out behaviour, pulling her own hair, banging her head and aggressive towards herself. Pt obsessed and preoccupied of the thought she will get an infection. Anxious, cooperative, no physical distress. Denies SI/HI/AH/VH. Denies falling, punching or kicking any objects. Denies pain, SOB, fever, chills, chest and abdominal discomfort. Denies recent use of alcohol or illicit drug. No evidence of physical injuries.

## 2019-11-05 NOTE — ED BEHAVIORAL HEALTH NOTE - BEHAVIORAL HEALTH NOTE
The Pt is a 39 yr old [place] female, , domiciled with spouse and 2 children; She is employed.  The Pt has hx of OCD, was last seen at the [facility] ED as well as Crisis [facility] in 10/22/2019 for an evaluation regarding her anxiety.  Pt was prescribed Prozac 10mg daily at the Crisis Center whereas at the [facility] ED, she was prescribed with Atarax 10mg q6Hrs PRN.  Pt has prior hx of anxiety disorder which she claimed was treated with an unrecalled psych med in 2003.  She has no past psych admissions and no hx of SA/self injurious behavior.  BIB  due to worsening anxiety with preoccupation that she will get sick, has been increasingly getting agitated at home, pulling her hair, and banging against the walls.      Met with patient this AM. She continues to be delusional and preoccupied regarding if she has contracted a disease into a wound on her foot. She was calm and cooperative. She denied SI/HI/SIB/intent/plan, depressive symptoms, AH/VH, samantha/hypomania or any other mental health issues. She denies substance use, PMH or allergies. AOx4. Per ER evaluation/collateral has been increasingly getting agitated at home, pulling her hair, and banging against the walls.  At this time, due to worsening anxiety symptoms with r/o psychotic disorder, the Pt's overall functionality has been significantly impaired. She is exhibiting affective dysregulation, is delusional, has poor insight, limited judgement.  The Pt has been unable to fend for self and others.  Pt is at elevated risk for inadvertent injury to self/others due to exacerbation and intensity of symptoms and will therefore require admission to inpatient psychiatry at this time.     Patient denies SI/HI. Patient is not aggressive or violent. Patient agreed verbally could alert staff if had worsening symptoms or urges to harm self or others. No 1:1 needed.

## 2019-11-05 NOTE — ED PROVIDER NOTE - CHIEF COMPLAINT
Call attempted to reach patient to make sure he knew about appointment made with . His first new patient appointment was made for 3/14/18 @ 1400. Left message with call back number. Daija Santiago RN 2:43 PM 12/15/17       The patient is a 39y Female complaining of anxiety.

## 2019-11-05 NOTE — ED ADULT NURSE REASSESSMENT NOTE - NS ED NURSE REASSESS COMMENT FT1
0830 am Received report from night RN KIYA pt sitting in hallway jonna powell si/hi presently pt awaiting bed assignment safety & comfort measures maintained eval on going.  10:00am Evaluated and cleared by Psych / ER attending for admission pt calm & jonna powell s/i h/avh presently pt made aware of admission to [address] safety & comfort measures maintained eval on going. 0830 am Received report from night RN KIYA pt lying in bed in nad calm & cooperative denies si/hi presently pt awaiting bed assignment safety & comfort measures maintained eval on going.  10:00am Evaluated and cleared by Psych / ER attending for admission pt calm & cooperative sherry s/i h/avh presently pt made aware of admission to [address] safety & comfort measures maintained eval on going.

## 2019-11-05 NOTE — ED PROVIDER NOTE - PROGRESS NOTE DETAILS
Laurent DAUGHERTY: Pt signed out to me.  She has been evaluated by the psychiatrist and will require admission.  Labs reviewed, UA noted to be positive for nitrites.  Upon re-evaluation, pt is denying any difficulty urinating, dysuria, or other symptoms.  Will not treat UA as pt is not symptomatic.

## 2019-11-05 NOTE — ED PROVIDER NOTE - ATTENDING CONTRIBUTION TO CARE
I performed a history and physical exam of the patient and discussed their management with the PA/NP.  I reviewed the ACP's note and agree with the documented findings and plan of care except as noted below. My medical decision making and observations are as follows:    39 yr old F, pmh OCD, anxiety and panic attack, with increased anxiety. Brought in by  due to acting out, pulling her own hair, banging her head and aggressive towards herself. Pt is obsessed and preoccupied of new scratches or marks on her body and that it might turn into an infection.  She is calm and cooperative but clearly anxious.  She denies any SI/HI/AVH.  Pt lives at home with  and 2 young children.  Pt is calm, cooperative, heart rrr, lungs cta, abd soft ntnd, no neuro deficits.  Pt was seen by psychiatry and due to safety concerns from , patient will be admitted.  labs, ekg, and admission.

## 2019-11-05 NOTE — ED ADULT NURSE REASSESSMENT NOTE - NS ED NURSE REASSESS COMMENT FT1
Pt ambulated to restroom without assistance.  Pt returned to  3 to sleep.  Will continue to monitor.

## 2019-11-05 NOTE — ED PROVIDER NOTE - CLINICAL SUMMARY MEDICAL DECISION MAKING FREE TEXT BOX
This is a 39 yr old F, pmh OCD, anxiety and panic attack, with increased anxiety.  Basic lab work and toxicology r/o underlying medical causes.  Collateral info from - who states pt was recently diagnozed with OCD and started new medication.  express concerns about his family and wife safety during her outburst.  Psych consult requested- recommendation inpatient psychiatric treatment.   At this time pt waiting for bed at [facility].

## 2019-11-06 PROCEDURE — 90853 GROUP PSYCHOTHERAPY: CPT

## 2019-11-06 PROCEDURE — 99222 1ST HOSP IP/OBS MODERATE 55: CPT

## 2019-11-06 RX ORDER — LISINOPRIL 2.5 MG/1
10 TABLET ORAL DAILY
Refills: 0 | Status: DISCONTINUED | OUTPATIENT
Start: 2019-11-06 | End: 2019-11-13

## 2019-11-06 RX ADMIN — LISINOPRIL 10 MILLIGRAM(S): 2.5 TABLET ORAL at 15:49

## 2019-11-06 RX ADMIN — Medication 20 MILLIGRAM(S): at 09:08

## 2019-11-06 RX ADMIN — Medication 1 MILLIGRAM(S): at 20:42

## 2019-11-06 NOTE — CHART NOTE - NSCHARTNOTEFT_GEN_A_CORE
Chart reviewed.    Patient with elevated systolic pressures in ED, and elevated diastolic pressures at [facility].     Normal renal function on labs.    Will start lisinopril 10mg daily, HCTZ 25mg daily as diastolic BP is >10mm Hg above goal.    BP is not emergently elevated.     Full consult to follow in AM.

## 2019-11-07 PROCEDURE — 99223 1ST HOSP IP/OBS HIGH 75: CPT

## 2019-11-07 PROCEDURE — 99232 SBSQ HOSP IP/OBS MODERATE 35: CPT

## 2019-11-07 PROCEDURE — 90853 GROUP PSYCHOTHERAPY: CPT

## 2019-11-07 RX ADMIN — Medication 1 MILLIGRAM(S): at 20:29

## 2019-11-07 RX ADMIN — LISINOPRIL 10 MILLIGRAM(S): 2.5 TABLET ORAL at 08:39

## 2019-11-07 RX ADMIN — Medication 20 MILLIGRAM(S): at 08:39

## 2019-11-07 NOTE — CONSULT NOTE ADULT - SUBJECTIVE AND OBJECTIVE BOX
HPI:   39 W h/o anxiety, OCD admitted to [facility] for anxiety, delusions. Consulted for management of high blood pressure.    Patient reports no h/o HTN. No chronic medical conditions other than psychiatric ones. Not in pain.     PAST MEDICAL & SURGICAL HISTORY:  Anxiety  OCD (obsessive compulsive disorder)  Panic attack  No pertinent past medical history  No significant past surgical history      Review of Systems:   CONSTITUTIONAL: No fever, weight loss, or fatigue  EYES: No eye pain, visual disturbances, or discharge  ENMT:  No difficulty hearing, tinnitus, vertigo; No sinus or throat pain  NECK: No pain or stiffness  RESPIRATORY: No cough, wheezing, chills or hemoptysis; No shortness of breath  CARDIOVASCULAR: No chest pain, palpitations, dizziness, or leg swelling  GASTROINTESTINAL: No abdominal or epigastric pain. No nausea, vomiting, or hematemesis; No diarrhea or constipation. No melena or hematochezia.  GENITOURINARY: No dysuria, frequency, hematuria, or incontinence  NEUROLOGICAL: No headaches, memory loss, loss of strength, numbness, or tremors  SKIN: No itching, burning, rashes, or lesions   LYMPH NODES: No enlarged glands  ENDOCRINE: No heat or cold intolerance; No hair loss  MUSCULOSKELETAL: No joint pain or swelling; No muscle, back, or extremity pain  HEME/LYMPH: No easy bruising, or bleeding gums  ALLERGY AND IMMUNOLOGIC: No hives or eczema    Allergies    No Known Allergies    Intolerances        Social History:   Lives with , family. Works in customer service. Does not smoke, drink, or use drugs per her report.    FAMILY HISTORY:  No pertinent family history in first degree relatives      MEDICATIONS  (STANDING):  FLUoxetine 20 milliGRAM(s) Oral daily  hydrochlorothiazide 25 milliGRAM(s) Oral daily  lisinopril 10 milliGRAM(s) Oral daily  LORazepam   Injectable 1 milliGRAM(s) IntraMuscular once    MEDICATIONS  (PRN):  LORazepam     Tablet 1 milliGRAM(s) Oral every 6 hours PRN Agitation      Vital Signs Last 24 Hrs  T(C): 36.9 (07 Nov 2019 07:29), Max: 36.9 (07 Nov 2019 07:29)  T(F): 98.5 (07 Nov 2019 07:29), Max: 98.5 (07 Nov 2019 07:29)  HR: --  BP: --  BP(mean): --  RR: 19 (07 Nov 2019 07:29) (19 - 19)  SpO2: --  CAPILLARY BLOOD GLUCOSE            PHYSICAL EXAM:  GENERAL: NAD, well-developed  HEAD:  Atraumatic, Normocephalic  EYES: EOMI, PERRLA, conjunctiva and sclera clear  NECK: Supple, No JVD  CHEST/LUNG: Clear to auscultation bilaterally; No wheeze  HEART: Regular rate and rhythm; No murmurs, rubs, or gallops  ABDOMEN: Soft, Nontender, Nondistended; Bowel sounds present  EXTREMITIES:  2+ Peripheral Pulses, No clubbing, cyanosis, or edema  PSYCH: AAOx3  NEUROLOGY: non-focal  SKIN: No rashes or lesions    LABS:    Complete Blood Count + Automated Diff (11.04.19 @ 22:45)    Nucleated RBC #: 0 K/uL    WBC Count: 8.78 K/uL    RBC Count: 4.89 M/uL    Hemoglobin: 13.4 g/dL    Hematocrit: 39.8 %    Mean Cell Volume: 81.4 fL    Mean Cell Hemoglobin: 27.4 pg    Mean Cell Hemoglobin Conc: 33.7 %    Red Cell Distrib Width: 12.4 %    Platelet Count - Automated: 196 K/uL    MPV: 11.8 fl    Auto Neutrophil #: 5.94 K/uL    Auto Lymphocyte #: 1.83 K/uL    Auto Monocyte #: 0.77 K/uL    Auto Eosinophil #: 0.15 K/uL    Auto Basophil #: 0.06 K/uL    Auto Neutrophil %: 67.7 %    Auto Lymphocyte %: 20.8 %    Auto Monocyte %: 8.8 %    Auto Eosinophil %: 1.7 %    Auto Basophil %: 0.7 %    Auto Immature Granulocyte %: 0.3: (Includes meta, myelo and promyelocytes) %      Comprehensive Metabolic Panel (11.04.19 @ 22:45)    Sodium, Serum: 138 mmol/L    Potassium, Serum: 3.5 mmol/L    Chloride, Serum: 106 mmol/L    Carbon Dioxide, Serum: 19 mmol/L    Anion Gap, Serum: 13 mmo/L    Blood Urea Nitrogen, Serum: 7 mg/dL    Creatinine, Serum: 0.48 mg/dL    Glucose, Serum: 127 mg/dL    Calcium, Total Serum: 9.5 mg/dL    Protein Total, Serum: 7.6 g/dL    Albumin, Serum: 4.0 g/dL    Bilirubin Total, Serum: 0.2 mg/dL    Alkaline Phosphatase, Serum: 71 u/L    Aspartate Aminotransferase (AST/SGOT): 18 u/L    Alanine Aminotransferase (ALT/SGPT): 20 u/L    eGFR if Non : 122: The units for eGFR are ml/min/1.73m2 (normalized body  surface area). The eGFR is calculated from a serum  creatinine using the CKD-EPI equation. Other variables  required for calculation are race, age and sex. Among  patients with chronic kidney disease (CKD), the eGFR is  useful in determining the stage of disease according to  KDOQI CKD classification. All eGFR results are reported  numerically with the following interpretation.    GFR  (ml/min/1.73 m2)          W/KIDNEY DAMAGE    W/O KIDNEY DMG  ==========================================================  >= 90.......................Stage 1..............Normal  60-89.......................Stage 2...........Decreased GFR  30-59.......................Stage 3..............Stage 3  15-29.......................Stage 4..............Stage 4  < 15........................Stage 5..............Stage 5    Each stage of CKD assumes that the associated GFR level  has been in effect for at least 3 months. Determination of  stages one and two (with eGFR > 59ml/min/m2) requires  estimation of kidney damage for at least 3 months as  defined by structural or functional abnormalities.    Limitations: All estimates of GFR will be less accurate  for patients at extremes of muscle mass (including but  not limited to frail elderly, critically ill, or cancer  patients), those with unusual diets, and those with  conditions associated with reduced secretion or  extrarenal elimination of creatinine. The eGFR equation  is not recommended for use in patients with unstable  creatinine levels. mL/min    eGFR if : 141 mL/min                    EKG(personally reviewed):    RADIOLOGY & ADDITIONAL TESTS:    Imaging Personally Reviewed:    Consultant(s) Notes Reviewed:      Care Discussed with Consultants/Other Providers:

## 2019-11-07 NOTE — CONSULT NOTE ADULT - ASSESSMENT
39 W h/o anxiety, OCD admitted to [facility] for anxiety, delusions. Consulted for management of high blood pressure.    1) Essential HTN - started on lisinopril 10mg and HCTZ 25mg on 11/6/2019. BP still above goal but will take several days before effects of these medications are seen. Will continue to trend. Will need repeat chemistry on Wednesday, 11/13, to monitor K, sCr.

## 2019-11-08 PROCEDURE — 99232 SBSQ HOSP IP/OBS MODERATE 35: CPT

## 2019-11-08 PROCEDURE — 90853 GROUP PSYCHOTHERAPY: CPT

## 2019-11-08 RX ADMIN — LISINOPRIL 10 MILLIGRAM(S): 2.5 TABLET ORAL at 08:38

## 2019-11-08 RX ADMIN — Medication 20 MILLIGRAM(S): at 08:38

## 2019-11-09 PROCEDURE — 99231 SBSQ HOSP IP/OBS SF/LOW 25: CPT

## 2019-11-09 RX ADMIN — Medication 20 MILLIGRAM(S): at 08:24

## 2019-11-09 RX ADMIN — LISINOPRIL 10 MILLIGRAM(S): 2.5 TABLET ORAL at 08:27

## 2019-11-10 PROCEDURE — 99231 SBSQ HOSP IP/OBS SF/LOW 25: CPT

## 2019-11-10 RX ADMIN — LISINOPRIL 10 MILLIGRAM(S): 2.5 TABLET ORAL at 09:49

## 2019-11-10 RX ADMIN — Medication 20 MILLIGRAM(S): at 09:49

## 2019-11-11 PROCEDURE — 99232 SBSQ HOSP IP/OBS MODERATE 35: CPT

## 2019-11-11 RX ORDER — FLUOXETINE HCL 10 MG
40 CAPSULE ORAL DAILY
Refills: 0 | Status: DISCONTINUED | OUTPATIENT
Start: 2019-11-11 | End: 2019-11-13

## 2019-11-11 RX ADMIN — LISINOPRIL 10 MILLIGRAM(S): 2.5 TABLET ORAL at 08:27

## 2019-11-11 RX ADMIN — Medication 20 MILLIGRAM(S): at 08:27

## 2019-11-12 PROCEDURE — 90853 GROUP PSYCHOTHERAPY: CPT

## 2019-11-12 PROCEDURE — 99231 SBSQ HOSP IP/OBS SF/LOW 25: CPT

## 2019-11-12 RX ORDER — FLUOXETINE HCL 10 MG
1 CAPSULE ORAL
Qty: 30 | Refills: 0
Start: 2019-11-12 | End: 2019-12-11

## 2019-11-12 RX ORDER — LISINOPRIL 2.5 MG/1
1 TABLET ORAL
Qty: 30 | Refills: 0
Start: 2019-11-12 | End: 2019-12-11

## 2019-11-12 RX ORDER — HYDROCHLOROTHIAZIDE 25 MG
1 TABLET ORAL
Qty: 30 | Refills: 0
Start: 2019-11-12 | End: 2019-12-11

## 2019-11-12 RX ADMIN — LISINOPRIL 10 MILLIGRAM(S): 2.5 TABLET ORAL at 08:51

## 2019-11-12 RX ADMIN — Medication 40 MILLIGRAM(S): at 08:51

## 2019-11-13 VITALS — TEMPERATURE: 98 F | RESPIRATION RATE: 18 BRPM

## 2019-11-13 LAB
ALBUMIN SERPL ELPH-MCNC: 4.1 G/DL — SIGNIFICANT CHANGE UP (ref 3.3–5)
ALP SERPL-CCNC: 70 U/L — SIGNIFICANT CHANGE UP (ref 40–120)
ALT FLD-CCNC: 29 U/L — SIGNIFICANT CHANGE UP (ref 4–33)
ANION GAP SERPL CALC-SCNC: 16 MMO/L — HIGH (ref 7–14)
AST SERPL-CCNC: 19 U/L — SIGNIFICANT CHANGE UP (ref 4–32)
BILIRUB SERPL-MCNC: 0.5 MG/DL — SIGNIFICANT CHANGE UP (ref 0.2–1.2)
BUN SERPL-MCNC: 17 MG/DL — SIGNIFICANT CHANGE UP (ref 7–23)
CALCIUM SERPL-MCNC: 9.7 MG/DL — SIGNIFICANT CHANGE UP (ref 8.4–10.5)
CHLORIDE SERPL-SCNC: 102 MMOL/L — SIGNIFICANT CHANGE UP (ref 98–107)
CO2 SERPL-SCNC: 20 MMOL/L — LOW (ref 22–31)
CREAT SERPL-MCNC: 0.59 MG/DL — SIGNIFICANT CHANGE UP (ref 0.5–1.3)
GLUCOSE SERPL-MCNC: 103 MG/DL — HIGH (ref 70–99)
POTASSIUM SERPL-MCNC: 3.8 MMOL/L — SIGNIFICANT CHANGE UP (ref 3.5–5.3)
POTASSIUM SERPL-SCNC: 3.8 MMOL/L — SIGNIFICANT CHANGE UP (ref 3.5–5.3)
PROT SERPL-MCNC: 7.8 G/DL — SIGNIFICANT CHANGE UP (ref 6–8.3)
SODIUM SERPL-SCNC: 138 MMOL/L — SIGNIFICANT CHANGE UP (ref 135–145)

## 2019-11-13 PROCEDURE — 99238 HOSP IP/OBS DSCHRG MGMT 30/<: CPT

## 2019-11-13 RX ADMIN — LISINOPRIL 10 MILLIGRAM(S): 2.5 TABLET ORAL at 09:37

## 2019-11-13 RX ADMIN — Medication 40 MILLIGRAM(S): at 09:37

## 2021-02-05 NOTE — ED ADULT TRIAGE NOTE - PAIN RATING/NUMBER SCALE (0-10): REST
Discharge Instructions    Discharged to home by car with relative. Discharged via wheelchair, hospital escort: Yes.  Special equipment needed: Not Applicable    Be sure to schedule a follow-up appointment with your primary care doctor or any specialists as instructed.     Discharge Plan:   Diet Plan: Discussed  Activity Level: Discussed  Confirmed Follow up Appointment: Patient to Call and Schedule Appointment  Confirmed Symptoms Management: Discussed  Medication Reconciliation Updated: Yes  Influenza Vaccine Indication: Not indicated: Previously immunized this influenza season and > 8 years of age    I understand that a diet low in cholesterol, fat, and sodium is recommended for good health. Unless I have been given specific instructions below for another diet, I accept this instruction as my diet prescription.   Other diet: Regular Healthy Diet     Special Instructions: None    · Is patient discharged on Warfarin / Coumadin?   No     Depression / Suicide Risk    As you are discharged from this [facility] facility, it is important to learn how to keep safe from harming yourself.    Recognize the warning signs:  · Abrupt changes in personality, positive or negative- including increase in energy   · Giving away possessions  · Change in eating patterns- significant weight changes-  positive or negative  · Change in sleeping patterns- unable to sleep or sleeping all the time   · Unwillingness or inability to communicate  · Depression  · Unusual sadness, discouragement and loneliness  · Talk of wanting to die  · Neglect of personal appearance   · Rebelliousness- reckless behavior  · Withdrawal from people/activities they love  · Confusion- inability to concentrate     If you or a loved one observes any of these behaviors or has concerns about self-harm, here's what you can do:  · Talk about it- your feelings and reasons for harming yourself  · Remove any means that you might use to hurt yourself (examples: pills,  rope, extension cords, firearm)  · Get professional help from the community (Mental Health, Substance Abuse, psychological counseling)  · Do not be alone:Call your Safe Contact- someone whom you trust who will be there for you.  · Call your local CRISIS HOTLINE 215-4098 or 640-290-6100  · Call your local Children's Mobile Crisis Response Team [place] (660) 328-9874 or www.[employer]  · Call the toll free National Suicide Prevention Hotlines   · National Suicide Prevention Lifeline 721-080-RRRL (2848)  · National Hope Line Network 800-SUICIDE (018-1912)       0

## 2022-08-02 NOTE — ED BEHAVIORAL HEALTH ASSESSMENT NOTE - NS ED BHA ED COURSE UTILIZATION OF 1 TO 1 IN ED YN
Alprazolam e-prescribed to preferred pharmacy via [employer].  Prescription Drug Monitoring Program (PDMP) reviewed, and therapy is appropriate at this time. No aberrant behavior identified.  Per 5/23/22 progress note, patient was instructed to continue current alprazolam regimen.  Patient will need a urine drug screen at follow-up appointment on 11/28/22 or sooner.      No

## 2023-01-20 NOTE — ED ADULT TRIAGE NOTE - CCCP TRG CHIEF CMPLNT
"Requested Prescriptions   Pending Prescriptions Disp Refills     FLUoxetine (PROZAC) 20 MG capsule [Pharmacy Med Name: FLUOXETINE HCL 20 MG CAPSULE] 90 capsule 0     Sig: TAKE 3 CAPSULES BY MOUTH EVERY DAY       SSRIs Protocol Failed - 1/18/2023 10:31 AM        Failed - PHQ-9 score less than 5 in past 6 months     Please review last PHQ-9 score.           Passed - Medication is active on med list        Passed - Patient is age 18 or older        Passed - No active pregnancy on record        Passed - No positive pregnancy test in last 12 months        Passed - Recent (6 mo) or future (30 days) visit within the authorizing provider's specialty     Patient had office visit in the last 6 months or has a visit in the next 30 days with authorizing provider or within the authorizing provider's specialty.  See \"Patient Info\" tab in inbasket, or \"Choose Columns\" in Meds & Orders section of the refill encounter.                 Routing refill request to provider for review/approval because medication did not pass protocol.    Pt's last appointment was on 03/30/22    Susan Moser RN  [facility]   "
anxiety, HTN

## 2023-10-23 RX ORDER — FLUOXETINE HCL 10 MG
1 CAPSULE ORAL
Qty: 0 | Refills: 0 | DISCHARGE